# Patient Record
Sex: FEMALE | Race: WHITE | NOT HISPANIC OR LATINO | Employment: UNEMPLOYED | URBAN - METROPOLITAN AREA
[De-identification: names, ages, dates, MRNs, and addresses within clinical notes are randomized per-mention and may not be internally consistent; named-entity substitution may affect disease eponyms.]

---

## 2018-04-30 ENCOUNTER — HOSPITAL ENCOUNTER (EMERGENCY)
Facility: HOSPITAL | Age: 63
Discharge: PSYCHIATRIC OR SUBSTANCE USE FACILITY | End: 2018-05-01
Attending: EMERGENCY MEDICINE
Payer: MEDICAID

## 2018-04-30 DIAGNOSIS — R42 DIZZINESS: ICD-10-CM

## 2018-04-30 DIAGNOSIS — F41.9 ANXIETY: ICD-10-CM

## 2018-04-30 DIAGNOSIS — R55 NEAR SYNCOPE: ICD-10-CM

## 2018-04-30 DIAGNOSIS — F32.A DEPRESSION, UNSPECIFIED DEPRESSION TYPE: Primary | ICD-10-CM

## 2018-04-30 DIAGNOSIS — Z72.0 TOBACCO ABUSE: ICD-10-CM

## 2018-04-30 LAB
ALBUMIN SERPL BCP-MCNC: 4.4 G/DL
ALP SERPL-CCNC: 48 U/L
ALT SERPL W/O P-5'-P-CCNC: 18 U/L
AMPHET+METHAMPHET UR QL: NEGATIVE
ANION GAP SERPL CALC-SCNC: 11 MMOL/L
APAP SERPL-MCNC: <3 UG/ML
AST SERPL-CCNC: 13 U/L
BARBITURATES UR QL SCN>200 NG/ML: NEGATIVE
BASOPHILS # BLD AUTO: 0.04 K/UL
BASOPHILS NFR BLD: 0.3 %
BENZODIAZ UR QL SCN>200 NG/ML: NEGATIVE
BILIRUB SERPL-MCNC: 0.3 MG/DL
BILIRUB UR QL STRIP: NEGATIVE
BUN SERPL-MCNC: 13 MG/DL
BZE UR QL SCN: NEGATIVE
CALCIUM SERPL-MCNC: 9.5 MG/DL
CANNABINOIDS UR QL SCN: NEGATIVE
CHLORIDE SERPL-SCNC: 103 MMOL/L
CLARITY UR: CLEAR
CO2 SERPL-SCNC: 27 MMOL/L
COLOR UR: YELLOW
CREAT SERPL-MCNC: 1 MG/DL
CREAT UR-MCNC: 39.7 MG/DL
D DIMER PPP IA.FEU-MCNC: <0.19 MG/L FEU
DIFFERENTIAL METHOD: ABNORMAL
EOSINOPHIL # BLD AUTO: 0.1 K/UL
EOSINOPHIL NFR BLD: 1 %
ERYTHROCYTE [DISTWIDTH] IN BLOOD BY AUTOMATED COUNT: 15.3 %
EST. GFR  (AFRICAN AMERICAN): >60 ML/MIN/1.73 M^2
EST. GFR  (NON AFRICAN AMERICAN): >60 ML/MIN/1.73 M^2
ETHANOL SERPL-MCNC: <10 MG/DL
GLUCOSE SERPL-MCNC: 105 MG/DL
GLUCOSE UR QL STRIP: NEGATIVE
HCT VFR BLD AUTO: 44 %
HGB BLD-MCNC: 14.9 G/DL
HGB UR QL STRIP: NEGATIVE
KETONES UR QL STRIP: NEGATIVE
LEUKOCYTE ESTERASE UR QL STRIP: ABNORMAL
LYMPHOCYTES # BLD AUTO: 3.3 K/UL
LYMPHOCYTES NFR BLD: 24.9 %
MCH RBC QN AUTO: 30.8 PG
MCHC RBC AUTO-ENTMCNC: 33.9 G/DL
MCV RBC AUTO: 91 FL
METHADONE UR QL SCN>300 NG/ML: NEGATIVE
MICROSCOPIC COMMENT: NORMAL
MONOCYTES # BLD AUTO: 0.8 K/UL
MONOCYTES NFR BLD: 6 %
NEUTROPHILS # BLD AUTO: 9 K/UL
NEUTROPHILS NFR BLD: 67.8 %
NITRITE UR QL STRIP: NEGATIVE
OPIATES UR QL SCN: NEGATIVE
PCP UR QL SCN>25 NG/ML: NEGATIVE
PH UR STRIP: 6 [PH] (ref 5–8)
PLATELET # BLD AUTO: 238 K/UL
PMV BLD AUTO: 9.9 FL
POTASSIUM SERPL-SCNC: 3.4 MMOL/L
PROT SERPL-MCNC: 7.3 G/DL
PROT UR QL STRIP: NEGATIVE
RBC # BLD AUTO: 4.84 M/UL
SALICYLATES SERPL-MCNC: <5 MG/DL
SODIUM SERPL-SCNC: 141 MMOL/L
SP GR UR STRIP: <=1.005 (ref 1–1.03)
SQUAMOUS #/AREA URNS HPF: 2 /HPF
TOXICOLOGY INFORMATION: NORMAL
TROPONIN I SERPL DL<=0.01 NG/ML-MCNC: <0.006 NG/ML
TSH SERPL DL<=0.005 MIU/L-ACNC: 1.75 UIU/ML
URN SPEC COLLECT METH UR: ABNORMAL
UROBILINOGEN UR STRIP-ACNC: NEGATIVE EU/DL
WBC # BLD AUTO: 13.25 K/UL
WBC #/AREA URNS HPF: 3 /HPF (ref 0–5)

## 2018-04-30 PROCEDURE — 80307 DRUG TEST PRSMV CHEM ANLYZR: CPT

## 2018-04-30 PROCEDURE — 93010 ELECTROCARDIOGRAM REPORT: CPT | Mod: ,,, | Performed by: INTERNAL MEDICINE

## 2018-04-30 PROCEDURE — 96374 THER/PROPH/DIAG INJ IV PUSH: CPT

## 2018-04-30 PROCEDURE — 63600175 PHARM REV CODE 636 W HCPCS: Performed by: EMERGENCY MEDICINE

## 2018-04-30 PROCEDURE — 25000003 PHARM REV CODE 250: Performed by: EMERGENCY MEDICINE

## 2018-04-30 PROCEDURE — 36415 COLL VENOUS BLD VENIPUNCTURE: CPT

## 2018-04-30 PROCEDURE — 93005 ELECTROCARDIOGRAM TRACING: CPT

## 2018-04-30 PROCEDURE — 80053 COMPREHEN METABOLIC PANEL: CPT

## 2018-04-30 PROCEDURE — 99285 EMERGENCY DEPT VISIT HI MDM: CPT | Mod: 25

## 2018-04-30 PROCEDURE — 85379 FIBRIN DEGRADATION QUANT: CPT

## 2018-04-30 PROCEDURE — G0425 INPT/ED TELECONSULT30: HCPCS | Mod: GT,AF,HB, | Performed by: PSYCHIATRY & NEUROLOGY

## 2018-04-30 PROCEDURE — 84484 ASSAY OF TROPONIN QUANT: CPT

## 2018-04-30 PROCEDURE — 84443 ASSAY THYROID STIM HORMONE: CPT

## 2018-04-30 PROCEDURE — 85025 COMPLETE CBC W/AUTO DIFF WBC: CPT

## 2018-04-30 PROCEDURE — 81000 URINALYSIS NONAUTO W/SCOPE: CPT | Mod: 59

## 2018-04-30 PROCEDURE — S4991 NICOTINE PATCH NONLEGEND: HCPCS | Performed by: EMERGENCY MEDICINE

## 2018-04-30 PROCEDURE — 80329 ANALGESICS NON-OPIOID 1 OR 2: CPT

## 2018-04-30 PROCEDURE — 80320 DRUG SCREEN QUANTALCOHOLS: CPT

## 2018-04-30 RX ORDER — FENOFIBRATE 160 MG/1
TABLET ORAL DAILY
COMMUNITY
Start: 2016-10-04

## 2018-04-30 RX ORDER — AMOXICILLIN AND CLAVULANATE POTASSIUM 875; 125 MG/1; MG/1
1 TABLET, FILM COATED ORAL
COMMUNITY

## 2018-04-30 RX ORDER — BUSPIRONE HYDROCHLORIDE 10 MG/1
10 TABLET ORAL 3 TIMES DAILY
COMMUNITY

## 2018-04-30 RX ORDER — PANTOPRAZOLE SODIUM 40 MG/1
40 TABLET, DELAYED RELEASE ORAL DAILY
COMMUNITY

## 2018-04-30 RX ORDER — SERTRALINE HYDROCHLORIDE 100 MG/1
TABLET, FILM COATED ORAL
COMMUNITY

## 2018-04-30 RX ORDER — IBUPROFEN 200 MG
1 TABLET ORAL DAILY
Status: DISCONTINUED | OUTPATIENT
Start: 2018-04-30 | End: 2018-05-01 | Stop reason: HOSPADM

## 2018-04-30 RX ORDER — METHYLPREDNISOLONE 4 MG/1
4 TABLET ORAL
COMMUNITY

## 2018-04-30 RX ORDER — GABAPENTIN 600 MG/1
TABLET ORAL
COMMUNITY

## 2018-04-30 RX ORDER — LEVOTHYROXINE SODIUM 100 UG/1
TABLET ORAL
COMMUNITY

## 2018-04-30 RX ADMIN — LORAZEPAM 0.5 MG: 2 INJECTION, SOLUTION INTRAMUSCULAR; INTRAVENOUS at 07:04

## 2018-04-30 RX ADMIN — NICOTINE 1 PATCH: 21 PATCH, EXTENDED RELEASE TRANSDERMAL at 11:04

## 2018-04-30 NOTE — ED NOTES
Bed: 09  Expected date:   Expected time:   Means of arrival: Ambulance Service  Comments:  natacha

## 2018-05-01 VITALS
RESPIRATION RATE: 20 BRPM | OXYGEN SATURATION: 94 % | BODY MASS INDEX: 29.57 KG/M2 | SYSTOLIC BLOOD PRESSURE: 128 MMHG | HEART RATE: 78 BPM | WEIGHT: 206.56 LBS | DIASTOLIC BLOOD PRESSURE: 70 MMHG | HEIGHT: 70 IN | TEMPERATURE: 98 F

## 2018-05-01 RX ORDER — BUSPIRONE HYDROCHLORIDE 10 MG/1
10 TABLET ORAL 3 TIMES DAILY
Status: DISCONTINUED | OUTPATIENT
Start: 2018-05-01 | End: 2018-05-01 | Stop reason: HOSPADM

## 2018-05-01 RX ORDER — LEVOTHYROXINE SODIUM 50 UG/1
100 TABLET ORAL
Status: DISCONTINUED | OUTPATIENT
Start: 2018-05-01 | End: 2018-05-01 | Stop reason: HOSPADM

## 2018-05-01 RX ORDER — FENOFIBRATE 145 MG/1
145 TABLET, FILM COATED ORAL DAILY
Status: DISCONTINUED | OUTPATIENT
Start: 2018-05-01 | End: 2018-05-01 | Stop reason: HOSPADM

## 2018-05-01 RX ORDER — AMOXICILLIN AND CLAVULANATE POTASSIUM 875; 125 MG/1; MG/1
1 TABLET, FILM COATED ORAL EVERY 12 HOURS
Status: DISCONTINUED | OUTPATIENT
Start: 2018-05-01 | End: 2018-05-01 | Stop reason: HOSPADM

## 2018-05-01 RX ORDER — PANTOPRAZOLE SODIUM 40 MG/1
40 TABLET, DELAYED RELEASE ORAL DAILY
Status: DISCONTINUED | OUTPATIENT
Start: 2018-05-01 | End: 2018-05-01 | Stop reason: HOSPADM

## 2018-05-01 RX ORDER — SERTRALINE HYDROCHLORIDE 50 MG/1
100 TABLET, FILM COATED ORAL DAILY
Status: DISCONTINUED | OUTPATIENT
Start: 2018-05-01 | End: 2018-05-01 | Stop reason: HOSPADM

## 2018-05-01 NOTE — ED NOTES
Next of Kin:   (Sandip) [phone]  Granddaughter (Hernesto) [phone]  Son-In-Law (Franky) [phone]

## 2018-05-01 NOTE — ED NOTES
Patient accepted to [facility]. Accepting Md is Dr. Loera. Call report to nurse Malloy at [phone]. Nurse Alexis prakash.

## 2018-05-01 NOTE — ED NOTES
SPD in ED for pt. Transport. Pt's , Sandip, contacted by RN to notify that pt. To be transported at this time to [facility]. Pt's  requested that RN hand up and call back to leave address and phone number for [facility] on his voicemail, as he had nothing to write with. RN called back and left voicemail with requested information - address, phone number to [facility].

## 2018-05-01 NOTE — ED NOTES
Personal belongings removed/secured in cabinet 28: socks, shorts, shirt, bra, underwear. Patient's home medications given to pt's , Sandip, to take home.

## 2018-05-01 NOTE — ED PROVIDER NOTES
"SCRIBE #1 NOTE: I, Brandy Medeiros, am scribing for, and in the presence of, Britni Roldan DO. I have scribed the entire note.      History      Chief Complaint   Patient presents with    Fatigue     family states pt had episodes of "zoning out and being weak"        Review of patient's allergies indicates:  No Known Allergies     HPI   HPI    2018, 8:10 PM   History obtained from the patient and family      History of Present Illness: Glenda Garzon is a 62 y.o. female patient who presents to the Emergency Department for anxiety which onset gradually PTA. Pt states a relative drowned while boating recently. Pt states she was at a restaurant PTA when she became "shaky" and fatigued. She states "my mind was tired". Grand-daughter states pt had 3 similar episodes today. Pt states she becomes dizzy and "shaky inside".  states pt has been having spells where she doesn't respond similar to today but states today was worse. He states these episodes follow periods of stress. He states pt is awake and will drink water but won't talk during the episodes. Symptoms are episodic and moderate in severity. No mitigating or exacerbating factors reported. Associated sxs include dysphoric mood, frontal HA, nausea, fatigue. Pt states she sometimes feels like she is "tired and doesn't want to do this anymore". She has been feeling this way since her mother . She denies any suicide attempts and states she wouldn't do it because of her grandchildren but has thought about it. Pt has no access to firearms. Patient denies any incontinence, weight changes, LOC, speech difficulty, extremity weakness/numbness, hematochezia, melena, dizziness, palpitations, CP, SOB, fever, chills, BLE edema/pain, emesis, HI, hallucinations, and all other sxs at this time.  Pt denies PMHx of CHF. No further complaints or concerns at this time.       Arrival mode: EMS    PCP: [facility]       Past Medical History:  Anxiety, " depression, high cholesterol, peripheral neuropathy, hypothyroidism    Past Surgical History:  Tubal ligation, excision of skin cancer    Family History:  Family history reviewed not relevant    Social History:  She denies tobacco, alcohol, drugs      ROS   Review of Systems   Constitutional: Positive for fatigue. Negative for chills, diaphoresis, fever and unexpected weight change.   HENT: Negative for sore throat.    Eyes: Negative for photophobia and visual disturbance.   Respiratory: Negative for shortness of breath.    Cardiovascular: Negative for chest pain, palpitations and leg swelling.   Gastrointestinal: Positive for nausea. Negative for vomiting.   Genitourinary: Negative for dysuria.   Musculoskeletal: Negative for back pain, myalgias, neck pain and neck stiffness.   Skin: Negative for rash.   Neurological: Positive for dizziness, tremors (generalized) and headaches (frontal). Negative for syncope, facial asymmetry, speech difficulty, weakness and numbness.        (-) incontinence   Hematological: Does not bruise/bleed easily.   Psychiatric/Behavioral: Positive for dysphoric mood and suicidal ideas. Negative for hallucinations and self-injury. The patient is nervous/anxious.         (-) HI   All other systems reviewed and are negative.    Physical Exam      Initial Vitals [04/30/18 1859]   BP Pulse Resp Temp SpO2   (!) 152/78 90 18 98.2 °F (36.8 °C) 96 %      MAP       102.67          Physical Exam  Nursing Notes and Vital Signs Reviewed.  Constitutional: Patient is in no acute distress. Well-developed and well-nourished.  Head: Atraumatic. Normocephalic.  Eyes: PERRL. EOM intact. Conjunctivae are not pale. No scleral icterus.  ENT: Mucous membranes are moist. Oropharynx is clear and symmetric.    Neck: Supple. Full ROM. No lymphadenopathy.  Cardiovascular: Regular rate. Regular rhythm. No murmurs, rubs, or gallops. Distal pulses are 2+ and symmetric.  Pulmonary/Chest: No respiratory distress. Clear to  "auscultation bilaterally. No wheezing or rales.  Abdominal: Soft and non-distended.  There is no tenderness.  No rebound, guarding, or rigidity.   Musculoskeletal: Moves all extremities. No obvious deformities. No edema. No calf tenderness.  Skin: Warm and dry.  Neurological:  Alert, awake, and appropriate.  Normal speech.  No acute focal neurological deficits are appreciated. Cranial nerves 2-12 are intact.  Finger-to-nose intact.  Speech clear.  NIH stroke scale 0  Psychiatric:               Behavior: cooperative, tearful              Mood and Affect: depressed affect              Thought Process: within normal limits              Suicidal Ideations: Yes              Suicidal Plan: No specific plan              Homicidal Ideations: No              Hallucinations: none    ED Course    Procedures  ED Vital Signs:  Vitals:    04/30/18 1859 04/30/18 1933 04/30/18 1935 04/30/18 1937   BP: (!) 152/78 124/67 110/60 113/62   Pulse: 90 81 85 91   Resp: 18      Temp: 98.2 °F (36.8 °C)      TempSrc: Oral      SpO2: 96%      Weight: 93.7 kg (206 lb 9.1 oz)      Height: 5' 10" (1.778 m)       04/30/18 2146 04/30/18 2158 04/30/18 2317   BP:  136/66 136/70   Pulse: 86 81 80   Resp: (!) 22 20 (!) 24   Temp:      TempSrc:      SpO2: 95% 95% (!) 94%   Weight:      Height:          Abnormal Lab Results:  Labs Reviewed   COMPREHENSIVE METABOLIC PANEL - Abnormal; Notable for the following:        Result Value    Potassium 3.4 (*)     Alkaline Phosphatase 48 (*)     All other components within normal limits   CBC W/ AUTO DIFFERENTIAL - Abnormal; Notable for the following:     WBC 13.25 (*)     RDW 15.3 (*)     Gran # (ANC) 9.0 (*)     All other components within normal limits   URINALYSIS - Abnormal; Notable for the following:     Specific Gravity, UA <=1.005 (*)     Leukocytes, UA Trace (*)     All other components within normal limits   ACETAMINOPHEN LEVEL - Abnormal; Notable for the following:     Acetaminophen (Tylenol), Serum <3.0 " (*)     All other components within normal limits   SALICYLATE LEVEL - Abnormal; Notable for the following:     Salicylate Lvl <5.0 (*)     All other components within normal limits   TROPONIN I   URINALYSIS MICROSCOPIC   D DIMER, QUANTITATIVE   D DIMER, QUANTITATIVE   TSH   ACETAMINOPHEN LEVEL   SALICYLATE LEVEL   ALCOHOL,MEDICAL (ETHANOL)   TSH   ALCOHOL,MEDICAL (ETHANOL)   DRUG SCREEN PANEL, URINE EMERGENCY   DRUG SCREEN PANEL, URINE EMERGENCY        All Lab Results:  Results for orders placed or performed during the hospital encounter of 04/30/18   Comprehensive metabolic panel   Result Value Ref Range    Sodium 141 136 - 145 mmol/L    Potassium 3.4 (L) 3.5 - 5.1 mmol/L    Chloride 103 95 - 110 mmol/L    CO2 27 23 - 29 mmol/L    Glucose 105 70 - 110 mg/dL    BUN, Bld 13 8 - 23 mg/dL    Creatinine 1.0 0.5 - 1.4 mg/dL    Calcium 9.5 8.7 - 10.5 mg/dL    Total Protein 7.3 6.0 - 8.4 g/dL    Albumin 4.4 3.5 - 5.2 g/dL    Total Bilirubin 0.3 0.1 - 1.0 mg/dL    Alkaline Phosphatase 48 (L) 55 - 135 U/L    AST 13 10 - 40 U/L    ALT 18 10 - 44 U/L    Anion Gap 11 8 - 16 mmol/L    eGFR if African American >60 >60 mL/min/1.73 m^2    eGFR if non African American >60 >60 mL/min/1.73 m^2   CBC auto differential   Result Value Ref Range    WBC 13.25 (H) 3.90 - 12.70 K/uL    RBC 4.84 4.00 - 5.40 M/uL    Hemoglobin 14.9 12.0 - 16.0 g/dL    Hematocrit 44.0 37.0 - 48.5 %    MCV 91 82 - 98 fL    MCH 30.8 27.0 - 31.0 pg    MCHC 33.9 32.0 - 36.0 g/dL    RDW 15.3 (H) 11.5 - 14.5 %    Platelets 238 150 - 350 K/uL    MPV 9.9 9.2 - 12.9 fL    Gran # (ANC) 9.0 (H) 1.8 - 7.7 K/uL    Lymph # 3.3 1.0 - 4.8 K/uL    Mono # 0.8 0.3 - 1.0 K/uL    Eos # 0.1 0.0 - 0.5 K/uL    Baso # 0.04 0.00 - 0.20 K/uL    Gran% 67.8 38.0 - 73.0 %    Lymph% 24.9 18.0 - 48.0 %    Mono% 6.0 4.0 - 15.0 %    Eosinophil% 1.0 0.0 - 8.0 %    Basophil% 0.3 0.0 - 1.9 %    Differential Method Automated    Troponin I   Result Value Ref Range    Troponin I <0.006 0.000 - 0.026  ng/mL   Urinalysis   Result Value Ref Range    Specimen UA Urine, Clean Catch     Color, UA Yellow Yellow, Straw, Eloisa    Appearance, UA Clear Clear    pH, UA 6.0 5.0 - 8.0    Specific Gravity, UA <=1.005 (A) 1.005 - 1.030    Protein, UA Negative Negative    Glucose, UA Negative Negative    Ketones, UA Negative Negative    Bilirubin (UA) Negative Negative    Occult Blood UA Negative Negative    Nitrite, UA Negative Negative    Urobilinogen, UA Negative <2.0 EU/dL    Leukocytes, UA Trace (A) Negative   Urinalysis Microscopic   Result Value Ref Range    WBC, UA 3 0 - 5 /hpf    Squam Epithel, UA 2 /hpf    Microscopic Comment SEE COMMENT    D dimer, quantitative   Result Value Ref Range    D-Dimer <0.19 <0.50 mg/L FEU   TSH   Result Value Ref Range    TSH 1.749 0.400 - 4.000 uIU/mL   Acetaminophen level   Result Value Ref Range    Acetaminophen (Tylenol), Serum <3.0 (L) 10.0 - 20.0 ug/mL   Salicylate level   Result Value Ref Range    Salicylate Lvl <5.0 (L) 15.0 - 30.0 mg/dL   Ethanol   Result Value Ref Range    Alcohol, Medical, Serum <10 <10 mg/dL   Drug screen panel, emergency   Result Value Ref Range    Benzodiazepines Negative     Methadone metabolites Negative     Cocaine (Metab.) Negative     Opiate Scrn, Ur Negative     Barbiturate Screen, Ur Negative     Amphetamine Screen, Ur Negative     THC Negative     Phencyclidine Negative     Creatinine, Random Ur 39.7 15.0 - 325.0 mg/dL    Toxicology Information SEE COMMENT        Imaging Results:  Imaging Results          CT Head Without Contrast (Final result)  Result time 04/30/18 21:55:27    Final result by Mark Jeffers Jr., MD (04/30/18 21:55:27)                 Impression:     No acute intracranial findings evident.       All CT scans at this facility use dose modulation, iterative reconstruction, and/or weight based dosing when appropriate to reduce radiation dose to as low as reasonably achievable.        Electronically signed by: MARK JEFFRES MD  Date:      04/30/18  Time:    21:55              Narrative:    Exam: CT HEAD WITHOUT CONTRAST    History:  dizziness       Technique: Noncontrast axial images of the head were obtained.    Comparison:None    Findings:     Ventricular system is normal.  No hydrocephalus.  No midline shift.  No abnormal density to indicate acute major vascular distribution ischemic infarction or hemorrhage.  No mass effect.  No extra-axial fluid collections.     Partially imaged mucus retention cysts in the maxillary sinuses.  No calvarial fracture.    Vascular calcifications.                             X-Ray Chest AP Portable (Final result)  Result time 04/30/18 19:39:41    Final result by Mark Jeffers Jr., MD (04/30/18 19:39:41)                 Impression:          No acute findings       Electronically signed by: MARK JEFFERS MD  Date:     04/30/18  Time:    19:39              Narrative:    EXAM:   XR CHEST AP PORTABLE    CLINICAL HISTORY:   Syncope and collapse.    COMPARISON:  None    FINDINGS:   Heart size is normal. Lungs appear clear of active disease.  No infiltrates or effusions.  No suspicious mass.                             The EKG was ordered, reviewed, and independently interpreted by the ED provider.  Interpretation time: 19:24  Rate: 81 BPM  Rhythm: Sinus rhythm with 1st degree AV block  Interpretation: No acute ST changes. No STEMI.         The Emergency Provider reviewed the vital signs and test results, which are outlined above.    ED Discussion     10:45 PM: Discussed pt's case with Dr. Francois (Psychiatry) who recommends a PEC, pt is gravely disabled.    10:48 PM: The PEC hold has been issued by Dr. Roldan at this time for SI, pt is gravely disabled.    12:14 AM: Pt has been medically cleared by Dr. Roldan at this time. Reassessed pt at this time. Pt is resting comfortably and appears in no acute distress. There are no psychiatric services offered at this facility. D/w pt all pertinent ED information and  plan to transfer to psychiatric facility for psychiatric treatment. Pt verbalizes understanding. Patient being transferred by [facility] for ongoing personal protection en route. Pt will be transported by personnel trained in CPR and CPI. Patient understands that there could be unforeseen motor vehicle accidents, inclement weather, or loss of vital signs that could result in potential death or permanent disability. All questions and complaints have been addressed at this time. Pt condition is stable at this time and is clear to transfer to psychiatric facility at this time.  Benefits inpatient psychiatric services.    12:24 AM Patient is medically clear for transfer..       MIPS: Acute Sinusitis  Note that the patient arrived in the emergency room.  She is currently being treated with Augmentin for sinus infection that was prescribed by another provider.  Augmentin will be continued as this is her usual prescribed medication.    ED Medication(s):  Medications   nicotine 21 mg/24 hr 1 patch (1 patch Transdermal Patch Applied 4/30/18 9297)   amoxicillin-clavulanate 875-125mg per tablet 1 tablet (not administered)   busPIRone tablet 10 mg (not administered)   fenofibrate tablet 145 mg (not administered)   levothyroxine tablet 100 mcg (not administered)   pantoprazole EC tablet 40 mg (not administered)   sertraline tablet 100 mg (not administered)   lorazepam (ATIVAN) injection 0.5 mg (0.5 mg Intravenous Given 4/30/18 1959)       New Prescriptions    No medications on file             Medical Decision Making    Medical Decision Making:   Clinical Tests:   Lab Tests: Ordered and Reviewed  Radiological Study: Reviewed and Ordered  Medical Tests: Reviewed and Ordered           Scribe Attestation:   Scribe #1: I performed the above scribed service and the documentation accurately describes the services I performed. I attest to the accuracy of the note.    Attending:   Physician Attestation Statement for Scribe #1: Britni AVILA  DO Yuli, personally performed the services described in this documentation, as scribed by Brandy Medeiros, in my presence, and it is both accurate and complete.          Clinical Impression       ICD-10-CM ICD-9-CM   1. Depression, unspecified depression type F32.9 311   2. Near syncope R55 780.2   3. Dizziness R42 780.4   4. Anxiety F41.9 300.00   5. Tobacco abuse Z72.0 305.1       Disposition:   Disposition: Transferred  Condition: Stable         Britni Roldan DO  05/01/18 0159       Britni Roldan DO  05/01/18 0222

## 2018-05-01 NOTE — ED NOTES
Pt. AAOx4. NAD, VSS, resp. E/U. Pt. Ambulatory out of ED with even, steady gait; accomp. By SPD with two personnel. Pt's belongings (clothing) sent with pt. Via [facility] staff. Original PEC and copy of chart sent with pt. Via [facility] staff member.

## 2018-05-01 NOTE — CONSULTS
"Tele-Consultation to Emergency Department from Psychiatry    Please see previous notes:    Patient agreeable to consultation via telepsychiatry.    Consultation started: 4/30/2018 at 9:30PM  The chief complaint leading to psychiatric consultation is: Depression  This consultation was requested by Dr. Roldan, the Emergency Department attending physician.  The location of the consulting psychiatrist is [address] [place] [address].  The patient location is [facility].  The patient arrived at the ED at:     Also present with the patient at the time of the consultation: , niece    Patient Identification:  Glenda Garzon is a 62 y.o. female.    Patient information was obtained from patient and relative(s).  Patient presented voluntarily to the Emergency Department     History of Present Illness:  Ms. Garzon is a 62 year old woman who presented to the ED tonight reporting severe depression and anxiety. She reports a number of recent stressors including the death yesterday of a relative in a boating accident.. She has had three episodes of "spells" today in which she doesn't respond to family members.Her  says these spells continue to worsen.  She reports a severely depressed mood with anergia, anhedonia, hopelessness ad passive suicide ideation. She feels overwhelmed and "doesn't want to do this anymore." She denied prior psychiatric hospitalization history.     Psychiatric History:   Hospitalization: No  Medication Trials: No  Suicide Attempts: no  Violence: no  Depression: yrd  Alka: no  AH's: no  Delusions: no    Review of Systems:      Past Medical History: No past medical history on file.     Seizures: no  Head trauma/l.o.c.: no  Wish to become pregnant[if female of childbearing age]: na    Allergies: none  Review of patient's allergies indicates:  No Known Allergies    Medications in ER:   Medications   lorazepam (ATIVAN) injection 0.5 mg (0.5 mg Intravenous Given 4/30/18 " "1959)       Medications at home: unknown    Substance Abuse History:   Alchohol: no  Drug: no     Legal History:   Past charges/incarcerations: no  Pending charges: no    Family Psychiatric History: unknown    Social History:   History of Physical/Sexual Abuse: no  Education: hs    Employment/Disability: retired   Financial: ok  Relationship Status/Sexual Orientation: hetero   Children: yes   Housing Status: ok  Confucianist: Spiritism   History: no   Recreational Activities: no  Access to Gun: no     Current Evaluation:     Constitutional  Vitals:  Vitals:    04/30/18 1859 04/30/18 1933 04/30/18 1935 04/30/18 1937   BP: (!) 152/78 124/67 110/60 113/62   Pulse: 90 81 85 91   Resp: 18      Temp: 98.2 °F (36.8 °C)      TempSrc: Oral      SpO2: 96%      Weight: 93.7 kg (206 lb 9.1 oz)      Height: 5' 10" (1.778 m)       04/30/18 2146   BP:    Pulse: 86   Resp: (!) 22   Temp:    TempSrc:    SpO2: 95%   Weight:    Height:       General:  unremarkable, age appropriate     Musculoskeletal  Muscle Strength/Tone:   moving arms normally   Gait & Station:   sitting on stretcher     Psychiatric  Level of Consciousness: alert  Orientation: oriented to person, place and time  Grooming: in hospital gown  Psychomotor Behavior: no agitation  Speech: normal in rate, rhythm and volume  Language: uses words appropriately  Mood: depressed  Affect: depressed  Thought Process: linear  Associations: tight  Thought Content: no ah  Memory: poor st memory  Attention: intact to interview  Fund of Knowledge: appears adequate  Insight: poor  Judgement: poor    Relevant Elements of Neurological Exam: no abnormality of posture noted    Assessment - Diagnosis - Goals:     Diagnosis/Impression: Major Depressive Disorder    Rec: Ms. Garzon is gravely disabled with severe depression, passive suicidal ideation and lacks capacity to adequately care for herself at the present time. She meets criteria for involuntary psychiatric hospitalization  "     Time with patient: 20 minutes    More than 50% of the time was spent counseling/coordinating care    Laboratory Data:   Labs Reviewed   COMPREHENSIVE METABOLIC PANEL - Abnormal; Notable for the following:        Result Value    Potassium 3.4 (*)     Alkaline Phosphatase 48 (*)     All other components within normal limits   CBC W/ AUTO DIFFERENTIAL - Abnormal; Notable for the following:     WBC 13.25 (*)     RDW 15.3 (*)     Gran # (ANC) 9.0 (*)     All other components within normal limits   URINALYSIS - Abnormal; Notable for the following:     Specific Gravity, UA <=1.005 (*)     Leukocytes, UA Trace (*)     All other components within normal limits   TROPONIN I   URINALYSIS MICROSCOPIC   D DIMER, QUANTITATIVE   D DIMER, QUANTITATIVE         Consulting clinician was informed of the encounter and consult note.    Consultation ended: 4/30/2018 at **